# Patient Record
Sex: FEMALE | Race: WHITE | Employment: UNEMPLOYED | URBAN - METROPOLITAN AREA
[De-identification: names, ages, dates, MRNs, and addresses within clinical notes are randomized per-mention and may not be internally consistent; named-entity substitution may affect disease eponyms.]

---

## 2017-01-11 ENCOUNTER — OFFICE VISIT (OUTPATIENT)
Dept: OBGYN CLINIC | Age: 22
End: 2017-01-11

## 2017-01-11 VITALS
WEIGHT: 293 LBS | HEIGHT: 63 IN | BODY MASS INDEX: 51.91 KG/M2 | DIASTOLIC BLOOD PRESSURE: 83 MMHG | HEART RATE: 116 BPM | SYSTOLIC BLOOD PRESSURE: 147 MMHG

## 2017-01-11 DIAGNOSIS — Z30.09 FAMILY PLANNING: Primary | ICD-10-CM

## 2017-01-11 NOTE — PROGRESS NOTES
Subjective:   Ms. John Guerra is a 25 y.o. who is now 6 weeks postpartum. OB History      Para Term  AB TAB SAB Ectopic Multiple Living    1 1 1      0 1        Method of delivery: primary  section  She is not breast-feeding and is not experiencing problems. Pregnancy complications: gestational hypertension and morbid obesity. She is feeling well and that she has no suicidal/homicidal inclination. She currently uses abstinence for contraception. She plans to use Ortho-Evra for contraception. Patient Active Problem List   Diagnosis Code    Morbid obesity due to excess calories ([facility]) E66.01    False labor O47.9    38 weeks gestation of pregnancy Z3A.38    Pregnancy-induced hypertension in third trimester O13.3    40 weeks gestation of pregnancy Z3A.40    Non-stress test reactive on fetal surveillance Z36    Gestational HTN O13.9    Post term pregnancy at 39 weeks gestation O46.0, Z3A.41     Current Outpatient Prescriptions   Medication Sig Dispense Refill    [START ON 2017] norelgestromin-ethinyl estradiol (ORTHO EVRA) 150-35 mcg/24 hr 1 Patch by TransDERmal route Every Saturday. Indications: PREGNANCY CONTRACEPTION 3 Patch 11    ibuprofen (MOTRIN) 800 mg tablet Take 1 Tab by mouth every eight (8) hours as needed for Pain. Indications: PAIN 30 Tab 0    oxyCODONE-acetaminophen (PERCOCET) 5-325 mg per tablet Take 1-2 Tabs by mouth every four (4) hours as needed. Max Daily Amount: 12 Tabs. 30 Tab 0    ferrous sulfate ER (IRON) 160 mg (50 mg iron) TbER tablet Take 1 Tab by mouth daily.  pnv w/o calcium-iron fum-fa 27-1 mg tab Take  by mouth.        No Known Allergies  Past Medical History   Diagnosis Date    Anemia     Herpes simplex virus (HSV) infection      hx cold sores on lip    Morbid obesity due to excess calories ([facility] [address] 2016    Pregnancy-induced hypertension in third trimester 2016    Trauma      hx broken middle finger on left hand     Past Surgical History   Procedure Laterality Date    Hx gyn       cyst drainage with venous malformation repair? ?    Hx other surgical       hx cysts in vaginal wall---had to \"cut the vein\"     Family History   Problem Relation Age of Onset    Arthritis-osteo Mother     Elevated Lipids Father     Heart Disease Father     Hypertension Father     Asthma Brother     Heart Disease Maternal Aunt         Objective:   Physical Exam:  Date of last Pap smear: not done  Physical Exam: GENERAL APPEARANCE: alert, well appearing, in no apparent distress  ABDOMEN POSTPARTUM: benign non-tender, without masses or organomegaly palpable    Assessment/Plan:   normal postpartum exam  able to resume normal activities  See orders and Patient Instructions  Resume all normal activities     ICD-10-CM ICD-9-CM    1. Family planning Z30.09 V25.09 AMB POC URINE PREGNANCY TEST, VISUAL COLOR COMPARISON      norelgestromin-ethinyl estradiol (ORTHO EVRA) 150-35 mcg/24 hr   Counseled about LARC. Pt. Will consider Nexplanon.

## 2017-01-11 NOTE — PATIENT INSTRUCTIONS
Patch for Birth Control: Care Instructions  Your Care Instructions    The patch is used to prevent pregnancy. It looks like a bandage. It gives you a regular dose of the hormones estrogen and progestin. The patch comes in packs of three. You change the patch once a week for 3 weeks and then go without a patch for 1 week. During this week, you have your period. One pack provides birth control for 1 month. Follow-up care is a key part of your treatment and safety. Be sure to make and go to all appointments, and call your doctor if you are having problems. It's also a good idea to know your test results and keep a list of the medicines you take. How can you care for yourself at home? How do you use the patch? · Talk to your doctor about the best day to start using the patch. Many women start using the patch on the first day of their period. Ask if you will need to use backup birth control, such as a condom, or avoid intercourse for a period of time. · Always follow the directions that came with the patch. In general:  ¨ Use the patch 3 out of 4 weeks. Put on a new patch every week on the same day of the week. The 4th week, don't wear a patch. You'll have your period. ¨ Put the patch on your lower belly, buttocks, or upper body. Don't put in on your breasts. ¨ Don't put lotions, oils, powders, or makeup on the area you're going to put the patch. They could keep the patch from sticking. ¨ Try not to place the patch under bra straps. What if you forget a patch or it falls off? Always read the label for specific instructions, or call your doctor. Here are some basic guidelines:  · In the first week, if you forget a patch or are late, put on a patch right away and:  ¨ Use backup birth control, such as a condom, or don't have intercourse for 7 days. ¨ If you had intercourse, you can use emergency contraception, such as the morning-after pill (Plan B).  You can use emergency contraception for up to 5 days after having had intercourse, but it works best if you take it right away. · In the second and third weeks:  ¨ If you are 1 to 2 days late, put on a new patch right away. You don't need backup birth control or emergency contraception. ¨ If you are 3 or more days late, put on a new patch right away. Use backup birth control or don't have intercourse for 7 days. If you had intercourse, you can use emergency contraception. · If a patch doesn't stick well or falls off:  ¨ For less than 24 hours, put it back on. If it doesn't stick well, use a new patch. ¨ For more than 24 hours, put on a new patch right away. Use backup birth control or don't have intercourse for 7 days. If you had intercourse, ask your doctor about using emergency contraception. What else do you need to know? · The patch has side effects. ¨ You may have very light or skipped periods. ¨ You may have bleeding between periods (spotting). This usually decreases after 3 to 4 months. ¨ You may have mood changes, less interest in sex, or weight gain. · Avoid exposing the patch to heat. This includes direct sunlight or using tanning beds, heating pads, electric blankets, hot tubs, or saunas. Direct sunlight or high heat changes how the patch releases hormones. This makes the chance of getting pregnant more likely. · To help remember to put a new patch on:  [address] on a calendar the days you need to change the patch. ¨ Set up a reminder using your computer or other similar device. · Check with your doctor before you use any other medicines, including over-the-counter medicines, vitamins, herbal products, and supplements. Birth control hormones may not work as well to prevent pregnancy when combined with other medicines. · The patch doesn't protect against sexually transmitted diseases (STDs), such as herpes or HIV/AIDS. If you're not sure whether your sex partner might have an STD, use a condom to protect against disease.   When should you call for help? Call your doctor now or seek immediate medical care if:  · You have severe belly pain. · You have signs of a blood clot, such as:  ¨ Pain in your calf, back of the knee, thigh, or groin. ¨ Redness and swelling in your leg or groin. · You have blurred vision or other problems seeing. · You have a severe headache. · You have severe trouble breathing. Watch closely for changes in your health, and be sure to contact your doctor if:  · You think you might be pregnant. · You have any problems with your birth control method. · You think you may be depressed. · You think you may have been exposed to or have a sexually transmitted disease. Where can you learn more? Go to http://gavin-essence.info/. Enter 136 [id number] in the search box to learn more about \"Patch for Birth Control: Care Instructions. \"  Current as of: May 30, 2016  Content Version: 11.1  © 2795-4122 [employer]. Care instructions adapted under license by [employer] (which disclaims liability or warranty for this information). If you have questions about a medical condition or this instruction, always ask your healthcare professional. [place] [address] any warranty or liability for your use of this information. Implant for Birth Control: Care Instructions  Your Care Instructions    The implant is used to prevent pregnancy. It's a thin yeni about the size of a matchstick that is inserted under the skin (subdermal) on the inside of your arm. The implant prevents pregnancy for 3 years. After it is put in, you don't have to do anything else to prevent pregnancy. Follow-up care is a key part of your treatment and safety. Be sure to make and go to all appointments, and call your doctor if you are having problems. It's also a good idea to know your test results and keep a list of the medicines you take. How can you care for yourself at home? How do you use the subdermal implant?   · The implant is put in and taken out by your doctor or another trained health professional. This is done in your doctor's office. It only takes a few minutes. · Ask your doctor if you need to use backup birth control, such as a condom. And ask if (and how long) you should avoid intercourse after you get the implant. You may need to do this, depending on where you are in your cycle. What else do you need to know? · The implant has side effects. ¨ You may have changes in your period. Your period may stop. You may also have spotting or bleeding between periods. ¨ You may have mood changes, less interest in sex, or weight gain. · Remember that 3 years after you receive the implant, you must have it removed or get a new one. ¨ If you don't replace the implant and don't use another form of birth control, you could get pregnant. ¨ If you have the implant removed, you'll have to find another method of birth control. If you don't, you may get pregnant. ¨ Even if you are planning to get pregnant, you have to have the implant removed. · Check with your doctor before you use any other medicines. This includes over-the-counter medicines, vitamins, herbal products, and supplements. Birth control hormones may not work as well to prevent pregnancy when combined with other medicines. · The implant doesn't protect against sexually transmitted infections (STIs), such as herpes or HIV/AIDS. If you're not sure if your sex partner might have an STI, use a condom to protect against infection. When should you call for help? Call your doctor now or seek immediate medical care if:  · You have severe belly pain. Watch closely for changes in your health, and be sure to contact your doctor if:  · You think you might be pregnant. · You have any problems with your birth control method. · You think you may be depressed. · You regularly have spotting.   · You think you may have been exposed to or have a sexually transmitted infection. Where can you learn more? Go to http://gavin-essence.info/. Enter M384 in the search box to learn more about \"Implant for Birth Control: Care Instructions. \"  Current as of: May 30, 2016  Content Version: 11.1  © 7985-8233 [employer]. Care instructions adapted under license by [employer] (which disclaims liability or warranty for this information). If you have questions about a medical condition or this instruction, always ask your healthcare professional. [address] any warranty or liability for your use of this information.